# Patient Record
(demographics unavailable — no encounter records)

---

## 2025-06-20 NOTE — HISTORY OF PRESENT ILLNESS
[FreeTextEntry1] : This is a 60 year y/o female with a PMHx of HLD, HTN, DM, Anemia, Lung Nodule coming in today for follow up. She has not any medications for a while. Yeserday and today, she found medication from pill box which she took.  She reports of [place] and occasional chest discomfort.  Denies  palpitations, diaphoresis, vision changes, HA, dizziness, syncope, cough, wheezing, edema, fever, chills, infection.

## 2025-07-10 NOTE — HISTORY OF PRESENT ILLNESS
[FreeTextEntry1] : bp check [de-identified] : Ms. BERRIOS is a 60 year F with PMHX of HLD, HTN, DM, Anemia, Lung Nodule presenting for bp check  Pt stop taking meds for a long time but saw Dr. Biggs 2 weeks ago, bp was elevated and restarted her on her amlo 10mg, metoprolol 150mg daily, olmesartan/HCTZ 40-25mg, but did not take meds today in anticipation for stress test.  Denies chest pain, sob, yates, dizziness, diaphoresis, palpitations, LE swelling, orthopnea, syncope, n/v, headache  Also had very high LDL 250s and a1c in 10s started on metformin referred to [facility]

## 2025-07-10 NOTE — HEALTH RISK ASSESSMENT
[0] : 2) Feeling down, depressed, or hopeless: Not at all (0) [PHQ-2 Negative - No further assessment needed] : PHQ-2 Negative - No further assessment needed [Never] : Never [[id number]] : 0

## 2025-07-10 NOTE — HISTORY OF PRESENT ILLNESS
[FreeTextEntry1] : bp check [de-identified] : Ms. BERRIOS is a 60 year F with PMHX of HLD, HTN, DM, Anemia, Lung Nodule presenting for bp check  Pt stop taking meds for a long time but saw Dr. Biggs 2 weeks ago, bp was elevated and restarted her on her amlo 10mg, metoprolol 150mg daily, olmesartan/HCTZ 40-25mg, but did not take meds today in anticipation for stress test.  Denies chest pain, sob, yates, dizziness, diaphoresis, palpitations, LE swelling, orthopnea, syncope, n/v, headache  Also had very high LDL 250s and a1c in 10s started on metformin referred to [facility]

## 2025-07-10 NOTE — HEALTH RISK ASSESSMENT
[0] : 2) Feeling down, depressed, or hopeless: Not at all (0) [PHQ-2 Negative - No further assessment needed] : PHQ-2 Negative - No further assessment needed [Never] : Never [[id number]] : 0

## 2025-07-10 NOTE — ADDENDUM
[FreeTextEntry1] : This note was written by Yanci Moore on 07/10/2025 acting as medical scribe for Dr. Edmundo Mcpherson. I, Dr. Edmundo Mcpherson, have read and attest that all the information, medical decision making and discharge instructions within are true and accurate.

## 2025-07-10 NOTE — HEALTH RISK ASSESSMENT
[0] : 2) Feeling down, depressed, or hopeless: Not at all (0) [PHQ-2 Negative - No further assessment needed] : PHQ-2 Negative - No further assessment needed [Never] : Never [[id number]] : 0

## 2025-07-10 NOTE — HISTORY OF PRESENT ILLNESS
[FreeTextEntry1] : bp check [de-identified] : Ms. BERRIOS is a 60 year F with PMHX of HLD, HTN, DM, Anemia, Lung Nodule presenting for bp check  Pt stop taking meds for a long time but saw Dr. Biggs 2 weeks ago, bp was elevated and restarted her on her amlo 10mg, metoprolol 150mg daily, olmesartan/HCTZ 40-25mg, but did not take meds today in anticipation for stress test.  Denies chest pain, sob, yates, dizziness, diaphoresis, palpitations, LE swelling, orthopnea, syncope, n/v, headache  Also had very high LDL 250s and a1c in 10s started on metformin referred to [facility]